# Patient Record
Sex: FEMALE | Race: WHITE | HISPANIC OR LATINO | URBAN - METROPOLITAN AREA
[De-identification: names, ages, dates, MRNs, and addresses within clinical notes are randomized per-mention and may not be internally consistent; named-entity substitution may affect disease eponyms.]

---

## 2018-11-05 ENCOUNTER — APPOINTMENT (RX ONLY)
Dept: URBAN - METROPOLITAN AREA CLINIC 116 | Facility: CLINIC | Age: 58
Setting detail: DERMATOLOGY
End: 2018-11-05

## 2018-11-05 DIAGNOSIS — T300 BLISTERS, EPIDERMAL LOSS [SECOND DEGREE], UNSPECIFIED SITE: ICD-10-CM

## 2018-11-05 DIAGNOSIS — D22 MELANOCYTIC NEVI: ICD-10-CM

## 2018-11-05 DIAGNOSIS — D18.0 HEMANGIOMA: ICD-10-CM

## 2018-11-05 DIAGNOSIS — T07XXXA ABRASION OR FRICTION BURN OF OTHER, MULTIPLE, AND UNSPECIFIED SITES, WITHOUT MENTION OF INFECTION: ICD-10-CM

## 2018-11-05 DIAGNOSIS — L81.4 OTHER MELANIN HYPERPIGMENTATION: ICD-10-CM

## 2018-11-05 DIAGNOSIS — L82.1 OTHER SEBORRHEIC KERATOSIS: ICD-10-CM

## 2018-11-05 PROBLEM — D22.5 MELANOCYTIC NEVI OF TRUNK: Status: ACTIVE | Noted: 2018-11-05

## 2018-11-05 PROBLEM — S50.811A ABRASION OF RIGHT FOREARM, INITIAL ENCOUNTER: Status: ACTIVE | Noted: 2018-11-05

## 2018-11-05 PROBLEM — S50.812A ABRASION OF LEFT FOREARM, INITIAL ENCOUNTER: Status: ACTIVE | Noted: 2018-11-05

## 2018-11-05 PROBLEM — D18.01 HEMANGIOMA OF SKIN AND SUBCUTANEOUS TISSUE: Status: ACTIVE | Noted: 2018-11-05

## 2018-11-05 PROBLEM — T22.211A BURN OF SECOND DEGREE OF RIGHT FOREARM, INITIAL ENCOUNTER: Status: ACTIVE | Noted: 2018-11-05

## 2018-11-05 PROBLEM — T22.212A BURN OF SECOND DEGREE OF LEFT FOREARM, INITIAL ENCOUNTER: Status: ACTIVE | Noted: 2018-11-05

## 2018-11-05 PROCEDURE — ? PRESCRIPTION

## 2018-11-05 PROCEDURE — ? ADDITIONAL NOTES

## 2018-11-05 PROCEDURE — 99203 OFFICE O/P NEW LOW 30 MIN: CPT

## 2018-11-05 PROCEDURE — ? COUNSELING

## 2018-11-05 RX ORDER — MUPIROCIN 20 MG/G
OINTMENT TOPICAL
Qty: 1 | Refills: 0 | Status: ERX | COMMUNITY
Start: 2018-11-05

## 2018-11-05 RX ORDER — SILVER SULFADIAZINE 10 MG/G
CREAM TOPICAL
Qty: 1 | Refills: 0 | Status: ERX | COMMUNITY
Start: 2018-11-05

## 2018-11-05 RX ADMIN — MUPIROCIN: 20 OINTMENT TOPICAL at 21:39

## 2018-11-05 RX ADMIN — SILVER SULFADIAZINE: 10 CREAM TOPICAL at 21:43

## 2018-11-05 ASSESSMENT — LOCATION DETAILED DESCRIPTION DERM
LOCATION DETAILED: RIGHT PROXIMAL DORSAL FOREARM
LOCATION DETAILED: RIGHT MEDIAL UPPER BACK
LOCATION DETAILED: EPIGASTRIC SKIN
LOCATION DETAILED: LEFT PROXIMAL DORSAL FOREARM

## 2018-11-05 ASSESSMENT — LOCATION SIMPLE DESCRIPTION DERM
LOCATION SIMPLE: RIGHT FOREARM
LOCATION SIMPLE: LEFT FOREARM
LOCATION SIMPLE: ABDOMEN
LOCATION SIMPLE: RIGHT UPPER BACK

## 2018-11-05 ASSESSMENT — LOCATION ZONE DERM
LOCATION ZONE: TRUNK
LOCATION ZONE: ARM

## 2022-05-04 ENCOUNTER — APPOINTMENT (RX ONLY)
Dept: URBAN - METROPOLITAN AREA CLINIC 116 | Facility: CLINIC | Age: 62
Setting detail: DERMATOLOGY
End: 2022-05-04

## 2022-05-04 DIAGNOSIS — D22 MELANOCYTIC NEVI: ICD-10-CM

## 2022-05-04 DIAGNOSIS — L81.4 OTHER MELANIN HYPERPIGMENTATION: ICD-10-CM

## 2022-05-04 DIAGNOSIS — D18.0 HEMANGIOMA: ICD-10-CM

## 2022-05-04 DIAGNOSIS — Z41.9 ENCOUNTER FOR PROCEDURE FOR PURPOSES OTHER THAN REMEDYING HEALTH STATE, UNSPECIFIED: ICD-10-CM

## 2022-05-04 DIAGNOSIS — L82.1 OTHER SEBORRHEIC KERATOSIS: ICD-10-CM

## 2022-05-04 PROBLEM — D22.5 MELANOCYTIC NEVI OF TRUNK: Status: ACTIVE | Noted: 2022-05-04

## 2022-05-04 PROBLEM — D18.01 HEMANGIOMA OF SKIN AND SUBCUTANEOUS TISSUE: Status: ACTIVE | Noted: 2022-05-04

## 2022-05-04 PROCEDURE — 99203 OFFICE O/P NEW LOW 30 MIN: CPT

## 2022-05-04 PROCEDURE — ? COSMETIC QUOTE

## 2022-05-04 PROCEDURE — ? SUNSCREEN RECOMMENDATIONS

## 2022-05-04 PROCEDURE — ? COUNSELING

## 2022-05-04 PROCEDURE — ? FULL BODY SKIN EXAM

## 2022-05-04 ASSESSMENT — LOCATION ZONE DERM: LOCATION ZONE: TRUNK

## 2022-05-04 ASSESSMENT — LOCATION SIMPLE DESCRIPTION DERM: LOCATION SIMPLE: ABDOMEN

## 2022-05-04 ASSESSMENT — LOCATION DETAILED DESCRIPTION DERM: LOCATION DETAILED: EPIGASTRIC SKIN

## 2022-05-04 NOTE — PROCEDURE: COSMETIC QUOTE
Voluma Price Per Syringe: 950
Juvederm Price Per Syringe: 500
Notice: We have created a more complete Cosmetic Quote plan.  The procedure name is also Cosmetic Quote.  Please review the new plan and hide the Cosmetic Quote plan you do not want to use.
Misc Procedure Description: LN2
Dysport Price Per Unit: 15
Detail Level: Simple
Discount Percentage: 0
Botox Price Per Unit: 13

## 2022-05-04 NOTE — PROCEDURE: FULL BODY SKIN EXAM
Price (Do Not Change): 0.00
Detail Level: Simple
Instructions: This plan will send the code FBSE to the PM system.  DO NOT or CHANGE the price.
Felicia